# Patient Record
Sex: MALE | Race: WHITE | NOT HISPANIC OR LATINO | Employment: FULL TIME | URBAN - METROPOLITAN AREA
[De-identification: names, ages, dates, MRNs, and addresses within clinical notes are randomized per-mention and may not be internally consistent; named-entity substitution may affect disease eponyms.]

---

## 2017-02-21 ENCOUNTER — OFFICE VISIT (OUTPATIENT)
Dept: URGENT CARE | Facility: PHYSICIAN GROUP | Age: 22
End: 2017-02-21
Payer: COMMERCIAL

## 2017-02-21 ENCOUNTER — HOSPITAL ENCOUNTER (OUTPATIENT)
Dept: RADIOLOGY | Facility: MEDICAL CENTER | Age: 22
End: 2017-02-21
Attending: PHYSICIAN ASSISTANT
Payer: COMMERCIAL

## 2017-02-21 VITALS
SYSTOLIC BLOOD PRESSURE: 112 MMHG | DIASTOLIC BLOOD PRESSURE: 74 MMHG | RESPIRATION RATE: 14 BRPM | HEIGHT: 74 IN | BODY MASS INDEX: 18.61 KG/M2 | WEIGHT: 145 LBS | TEMPERATURE: 96.8 F | HEART RATE: 100 BPM | OXYGEN SATURATION: 97 %

## 2017-02-21 DIAGNOSIS — M79.671 PAIN OF RIGHT HEEL: ICD-10-CM

## 2017-02-21 DIAGNOSIS — M25.571 ACUTE RIGHT ANKLE PAIN: ICD-10-CM

## 2017-02-21 DIAGNOSIS — M72.2 PLANTAR FASCIITIS, RIGHT: ICD-10-CM

## 2017-02-21 DIAGNOSIS — S93.401A SPRAIN OF RIGHT ANKLE, UNSPECIFIED LIGAMENT, INITIAL ENCOUNTER: ICD-10-CM

## 2017-02-21 PROCEDURE — 73630 X-RAY EXAM OF FOOT: CPT | Mod: RT

## 2017-02-21 PROCEDURE — 73610 X-RAY EXAM OF ANKLE: CPT | Mod: RT

## 2017-02-21 PROCEDURE — 99203 OFFICE O/P NEW LOW 30 MIN: CPT | Performed by: PHYSICIAN ASSISTANT

## 2017-02-21 ASSESSMENT — ENCOUNTER SYMPTOMS
COUGH: 0
JOINT SWELLING: 0
SHORTNESS OF BREATH: 0
ABDOMINAL PAIN: 0
NECK PAIN: 0
FALLS: 0
FEVER: 0
DIZZINESS: 0
VOMITING: 0
EYE REDNESS: 0
TINGLING: 0
BACK PAIN: 0
EYE DISCHARGE: 0
CHILLS: 0

## 2017-02-21 NOTE — PROGRESS NOTES
"Subjective:      Neel Donovan is a 21 y.o. male who presents with Foot Pain          Pt is 20 y/o male who presents with right foot pain after stopping on a lighter that accidentally fell inside his shoe. He reports immediate heel pain. He notes that since the injury- he did not have any broken skin or open wound, or FB sensation- he has been having heel pain with walking and pain inside his right ankle. He tried to do yoga today and \"couldn't keep\" half of the poses.   Foot Problem  This is a new problem. Episode onset: 2-3 weeks ago. The problem occurs constantly. The problem has been unchanged. Pertinent negatives include no abdominal pain, chest pain, chills, congestion, coughing, fever, joint swelling, neck pain, rash or vomiting. Associated symptoms comments: Positive for tingling up his leg. . The symptoms are aggravated by walking. Treatments tried: Ace bandage, ice, and changing his gait.  The treatment provided mild relief.       Review of Systems   Constitutional: Negative for fever and chills.   HENT: Negative for congestion.    Eyes: Negative for discharge and redness.   Respiratory: Negative for cough and shortness of breath.    Cardiovascular: Negative for chest pain and leg swelling.   Gastrointestinal: Negative for vomiting and abdominal pain.   Genitourinary: Negative for dysuria and urgency.   Musculoskeletal: Positive for joint pain. Negative for back pain, falls, joint swelling and neck pain.   Skin: Negative for itching and rash.   Neurological: Negative for dizziness and tingling.          Objective:     /74 mmHg  Pulse 100  Temp(Src) 36 °C (96.8 °F)  Resp 14  Ht 1.88 m (6' 2\")  Wt 65.772 kg (145 lb)  BMI 18.61 kg/m2  SpO2 97%   PMH:  has no past medical history on file.  MEDS: No current outpatient prescriptions on file.  ALLERGIES: No Known Allergies  SURGHX: History reviewed. No pertinent past surgical history.  SOCHX:  reports that he has never smoked. He does not have any " smokeless tobacco history on file.  FH: Family history was reviewed, no pertinent findings to report    Physical Exam   Constitutional: He is oriented to person, place, and time. He appears well-developed and well-nourished.   HENT:   Head: Normocephalic and atraumatic.   Eyes: EOM are normal. Pupils are equal, round, and reactive to light.   Neck: Normal range of motion. Neck supple.   Cardiovascular: Normal rate and regular rhythm.    Pulmonary/Chest: Effort normal. No respiratory distress.   Musculoskeletal:        Right ankle: He exhibits decreased range of motion. Tenderness. Medial malleolus tenderness found. No head of 5th metatarsal and no proximal fibula tenderness found. Achilles tendon exhibits no pain, no defect and normal Suárez's test results.        Feet:    Noted tenderness over the deltoid ligament.   Noted tenderness over the insertion of the heel of the plantar fascia- without any skin changes. Pain illicit ed with plantar flexion. Without other leg/knee/or toe tenderness   Neurological: He is alert and oriented to person, place, and time.   Skin: Skin is warm. No rash noted.   Psychiatric: He has a normal mood and affect. His behavior is normal.   Vitals reviewed.         Xr foot and ankle- without acute fracture or dislocation noted. Reviewed with the pt. And by myself .     Assessment/Plan:     1. Pain of right heel  - DX-FOOT-COMPLETE 3+ RIGHT; Future  - DX-ANKLE 3+ VIEWS RIGHT; Future    2. Acute right ankle pain  - DX-FOOT-COMPLETE 3+ RIGHT; Future  - DX-ANKLE 3+ VIEWS RIGHT; Future    3. Plantar fasciitis   4. Right ankle sprain    Due to inability to bear weight after 3 weeks- pt. Was placed in walking boot- a referral was also made for the pt. To see podiatry as he may need further evaluation/or PT for plantar fasciitis- pt. Was given exercises to do at home and is to use a frozen tennis ball for massage of the fascia.   Pt. Had significant improvement with the walking boot today.   He  is to trial NSAIDs.   Patient given precautionary s/sx that mandate immediate follow up and evaluation in the ED. Advised of risks of not doing so.    DDX, Supportive care, and indications for immediate follow-up discussed with patient.    Instructed to return to clinic or nearest emergency department if we are not available for any change in condition, further concerns, or worsening of symptoms.    The patient demonstrated a good understanding and agreed with the treatment plan.

## 2017-09-17 ENCOUNTER — OFFICE VISIT (OUTPATIENT)
Dept: URGENT CARE | Facility: PHYSICIAN GROUP | Age: 22
End: 2017-09-17
Payer: COMMERCIAL

## 2017-09-17 ENCOUNTER — HOSPITAL ENCOUNTER (OUTPATIENT)
Dept: RADIOLOGY | Facility: MEDICAL CENTER | Age: 22
End: 2017-09-17
Attending: FAMILY MEDICINE
Payer: COMMERCIAL

## 2017-09-17 VITALS
RESPIRATION RATE: 12 BRPM | WEIGHT: 152 LBS | DIASTOLIC BLOOD PRESSURE: 58 MMHG | SYSTOLIC BLOOD PRESSURE: 110 MMHG | HEART RATE: 52 BPM | OXYGEN SATURATION: 97 % | BODY MASS INDEX: 19.52 KG/M2 | TEMPERATURE: 98.7 F

## 2017-09-17 DIAGNOSIS — R05.9 COUGH: ICD-10-CM

## 2017-09-17 DIAGNOSIS — J01.00 ACUTE MAXILLARY SINUSITIS, RECURRENCE NOT SPECIFIED: ICD-10-CM

## 2017-09-17 PROCEDURE — 71020 DX-CHEST-2 VIEWS: CPT

## 2017-09-17 PROCEDURE — 99214 OFFICE O/P EST MOD 30 MIN: CPT | Performed by: FAMILY MEDICINE

## 2017-09-17 RX ORDER — AMOXICILLIN AND CLAVULANATE POTASSIUM 875; 125 MG/1; MG/1
1 TABLET, FILM COATED ORAL 2 TIMES DAILY
Qty: 14 TAB | Refills: 0 | Status: SHIPPED | OUTPATIENT
Start: 2017-09-17 | End: 2017-09-24

## 2017-09-17 NOTE — PROGRESS NOTES
Chief Complaint   Patient presents with   • Cold Symptoms     x2wks cough,           Sinusitis  This is a new problem. The current episode started in the past 14 days. The problem has been gradually worsening since onset. There has been no fever. Associated symptoms include headaches, nasal and sinus pain, coughing, headaches and sinus pressure. Pertinent negatives include no sneezing or sore throat. Past treatments include nothing.    His wife recently had pneumonia.    Social History   Substance Use Topics   • Smoking status: Current Some Day Smoker   • Smokeless tobacco: Current User     Types: Chew   • Alcohol use Yes         No current outpatient prescriptions on file prior to visit.     No current facility-administered medications on file prior to visit.            No Known Allergies      Family history was reviewed and not pertinent     Review of Systems   Constitutional: Negative for fever.   HENT: Positive for congestion and sinus pressure. Negative for sneezing and sore throat.    Respiratory: Positive for cough.    Neurological: Positive for headaches.   All other systems reviewed and are negative.         Objective:     Blood pressure 110/58, pulse (!) 52, temperature 37.1 °C (98.7 °F), resp. rate 12, weight 68.9 kg (152 lb), SpO2 97 %.      Physical Exam   Constitutional: patient is oriented to person, place, and time. Patient appears well-developed and well-nourished.   HENT:   Head: Normocephalic and atraumatic.   Right Ear: Hearing, tympanic membrane and external ear normal.   Left Ear: Hearing, tympanic membrane and external ear normal.   Nose: Mucosal edema and rhinorrhea present. No sinus tenderness. No epistaxis.   bilat sinus tenderness present   Mouth/Throat: Uvula is midline and mucous membranes are normal. Oropharyngeal exudate present. No posterior oropharyngeal edema or posterior oropharyngeal erythema.   Eyes: Conjunctivae and EOM are normal. Pupils are equal, round, and reactive to light.  Left and right eyes - no discharge. No scleral icterus.   Neck: Normal range of motion. Neck supple. No JVD present. No tracheal deviation present. No thyromegaly present.   Cardiovascular: Normal rate, regular rhythm, normal heart sounds and intact distal pulses.    No murmur heard.  Pulmonary/Chest: Breath sounds normal. No respiratory distress.   no wheezes, rales.      Musculoskeletal: Normal range of motion.   no edema.   Lymphadenopathy:     There is positive  cervical adenopathy.   Neurological:   alert and oriented to person, place, and time.   Skin: Skin is warm and dry. No erythema.   Psychiatric:   normal mood and affect.  behavior is normal.   Nursing note and vitals reviewed.     Narrative       9/17/2017 2:50 PM    HISTORY/REASON FOR EXAM:  Cough for 2 weeks.      TECHNIQUE/EXAM DESCRIPTION AND NUMBER OF VIEWS:  Two views of the chest.    COMPARISON:  None.    FINDINGS:  The heart is normal in size.  No pulmonary infiltrates or consolidations are noted.  No pleural effusions are appreciated.     Impression       No active disease.   Reading Provider Reading Date   Glen Steele M.D. Sep 17, 2017   Signing Provider Signing Date Signing Time   Glen Steele M.D. Sep 17, 2017  2:54 PM          Assessment/Plan:        1. Acute pansinusitis, recurrence not specified   Chest x-ray was personally interpreted and reviewed. No acute cardiopulmonary findings. No pulmonary infiltrates or densities. Cardiac silhouette is normal. No hemidiaphragm elevation. No bony abnormalities.    - azithromycin (ZITHROMAX) 250 MG Tab; Take as directed  Dispense: 6 Tab; Refill: 0  - fluticasone (FLONASE) 50 MCG/ACT nasal spray; Spray 1 Spray in nose 2 times a day.  Dispense: 1 Bottle; Refill: 0  - benzonatate (TESSALON) 100 MG Cap; Take 1 Cap by mouth 3 times a day as needed for Cough.  Dispense: 60 Cap; Refill: 0